# Patient Record
Sex: MALE | Race: WHITE | URBAN - METROPOLITAN AREA
[De-identification: names, ages, dates, MRNs, and addresses within clinical notes are randomized per-mention and may not be internally consistent; named-entity substitution may affect disease eponyms.]

---

## 2023-04-15 ENCOUNTER — EMERGENCY (EMERGENCY)
Facility: HOSPITAL | Age: 83
LOS: 0 days | Discharge: HOME | End: 2023-04-15
Attending: STUDENT IN AN ORGANIZED HEALTH CARE EDUCATION/TRAINING PROGRAM
Payer: MEDICARE

## 2023-04-15 VITALS
TEMPERATURE: 98 F | DIASTOLIC BLOOD PRESSURE: 66 MMHG | SYSTOLIC BLOOD PRESSURE: 164 MMHG | OXYGEN SATURATION: 98 % | HEART RATE: 70 BPM | RESPIRATION RATE: 18 BRPM

## 2023-04-15 VITALS
OXYGEN SATURATION: 97 % | TEMPERATURE: 98 F | DIASTOLIC BLOOD PRESSURE: 76 MMHG | WEIGHT: 162.04 LBS | SYSTOLIC BLOOD PRESSURE: 163 MMHG | RESPIRATION RATE: 18 BRPM | HEIGHT: 68 IN | HEART RATE: 68 BPM

## 2023-04-15 DIAGNOSIS — I10 ESSENTIAL (PRIMARY) HYPERTENSION: ICD-10-CM

## 2023-04-15 DIAGNOSIS — F03.90 UNSPECIFIED DEMENTIA WITHOUT BEHAVIORAL DISTURBANCE: ICD-10-CM

## 2023-04-15 DIAGNOSIS — R47.01 APHASIA: ICD-10-CM

## 2023-04-15 DIAGNOSIS — Z20.822 CONTACT WITH AND (SUSPECTED) EXPOSURE TO COVID-19: ICD-10-CM

## 2023-04-15 DIAGNOSIS — I72.5 ANEURYSM OF OTHER PRECEREBRAL ARTERIES: ICD-10-CM

## 2023-04-15 DIAGNOSIS — R42 DIZZINESS AND GIDDINESS: ICD-10-CM

## 2023-04-15 LAB
ALBUMIN SERPL ELPH-MCNC: 4 G/DL — SIGNIFICANT CHANGE UP (ref 3.5–5.2)
ALP SERPL-CCNC: 54 U/L — SIGNIFICANT CHANGE UP (ref 30–115)
ALT FLD-CCNC: 18 U/L — SIGNIFICANT CHANGE UP (ref 0–41)
ANION GAP SERPL CALC-SCNC: 7 MMOL/L — SIGNIFICANT CHANGE UP (ref 7–14)
APPEARANCE UR: CLEAR — SIGNIFICANT CHANGE UP
AST SERPL-CCNC: 19 U/L — SIGNIFICANT CHANGE UP (ref 0–41)
BASOPHILS # BLD AUTO: 0.02 K/UL — SIGNIFICANT CHANGE UP (ref 0–0.2)
BASOPHILS NFR BLD AUTO: 0.3 % — SIGNIFICANT CHANGE UP (ref 0–1)
BILIRUB SERPL-MCNC: 0.6 MG/DL — SIGNIFICANT CHANGE UP (ref 0.2–1.2)
BILIRUB UR-MCNC: NEGATIVE — SIGNIFICANT CHANGE UP
BUN SERPL-MCNC: 23 MG/DL — HIGH (ref 10–20)
CALCIUM SERPL-MCNC: 9.1 MG/DL — SIGNIFICANT CHANGE UP (ref 8.4–10.4)
CHLORIDE SERPL-SCNC: 105 MMOL/L — SIGNIFICANT CHANGE UP (ref 98–110)
CO2 SERPL-SCNC: 25 MMOL/L — SIGNIFICANT CHANGE UP (ref 17–32)
COLOR SPEC: SIGNIFICANT CHANGE UP
CREAT SERPL-MCNC: 0.7 MG/DL — SIGNIFICANT CHANGE UP (ref 0.7–1.5)
DIFF PNL FLD: NEGATIVE — SIGNIFICANT CHANGE UP
EGFR: 91 ML/MIN/1.73M2 — SIGNIFICANT CHANGE UP
EOSINOPHIL # BLD AUTO: 0.02 K/UL — SIGNIFICANT CHANGE UP (ref 0–0.7)
EOSINOPHIL NFR BLD AUTO: 0.3 % — SIGNIFICANT CHANGE UP (ref 0–8)
GLUCOSE SERPL-MCNC: 120 MG/DL — HIGH (ref 70–99)
GLUCOSE UR QL: NEGATIVE — SIGNIFICANT CHANGE UP
HCT VFR BLD CALC: 43 % — SIGNIFICANT CHANGE UP (ref 42–52)
HGB BLD-MCNC: 14.2 G/DL — SIGNIFICANT CHANGE UP (ref 14–18)
IMM GRANULOCYTES NFR BLD AUTO: 0.1 % — SIGNIFICANT CHANGE UP (ref 0.1–0.3)
KETONES UR-MCNC: NEGATIVE — SIGNIFICANT CHANGE UP
LEUKOCYTE ESTERASE UR-ACNC: NEGATIVE — SIGNIFICANT CHANGE UP
LYMPHOCYTES # BLD AUTO: 1.85 K/UL — SIGNIFICANT CHANGE UP (ref 1.2–3.4)
LYMPHOCYTES # BLD AUTO: 26.4 % — SIGNIFICANT CHANGE UP (ref 20.5–51.1)
MAGNESIUM SERPL-MCNC: 2.2 MG/DL — SIGNIFICANT CHANGE UP (ref 1.8–2.4)
MCHC RBC-ENTMCNC: 30.8 PG — SIGNIFICANT CHANGE UP (ref 27–31)
MCHC RBC-ENTMCNC: 33 G/DL — SIGNIFICANT CHANGE UP (ref 32–37)
MCV RBC AUTO: 93.3 FL — SIGNIFICANT CHANGE UP (ref 80–94)
MONOCYTES # BLD AUTO: 0.62 K/UL — HIGH (ref 0.1–0.6)
MONOCYTES NFR BLD AUTO: 8.9 % — SIGNIFICANT CHANGE UP (ref 1.7–9.3)
NEUTROPHILS # BLD AUTO: 4.48 K/UL — SIGNIFICANT CHANGE UP (ref 1.4–6.5)
NEUTROPHILS NFR BLD AUTO: 64 % — SIGNIFICANT CHANGE UP (ref 42.2–75.2)
NITRITE UR-MCNC: NEGATIVE — SIGNIFICANT CHANGE UP
NRBC # BLD: 0 /100 WBCS — SIGNIFICANT CHANGE UP (ref 0–0)
PH UR: 7 — SIGNIFICANT CHANGE UP (ref 5–8)
PLATELET # BLD AUTO: 203 K/UL — SIGNIFICANT CHANGE UP (ref 130–400)
PMV BLD: 9.2 FL — SIGNIFICANT CHANGE UP (ref 7.4–10.4)
POTASSIUM SERPL-MCNC: 4.6 MMOL/L — SIGNIFICANT CHANGE UP (ref 3.5–5)
POTASSIUM SERPL-SCNC: 4.6 MMOL/L — SIGNIFICANT CHANGE UP (ref 3.5–5)
PROT SERPL-MCNC: 6.7 G/DL — SIGNIFICANT CHANGE UP (ref 6–8)
PROT UR-MCNC: NEGATIVE — SIGNIFICANT CHANGE UP
RBC # BLD: 4.61 M/UL — LOW (ref 4.7–6.1)
RBC # FLD: 12.2 % — SIGNIFICANT CHANGE UP (ref 11.5–14.5)
SARS-COV-2 RNA SPEC QL NAA+PROBE: SIGNIFICANT CHANGE UP
SODIUM SERPL-SCNC: 137 MMOL/L — SIGNIFICANT CHANGE UP (ref 135–146)
SP GR SPEC: 1.01 — SIGNIFICANT CHANGE UP (ref 1.01–1.03)
UROBILINOGEN FLD QL: SIGNIFICANT CHANGE UP
WBC # BLD: 7 K/UL — SIGNIFICANT CHANGE UP (ref 4.8–10.8)
WBC # FLD AUTO: 7 K/UL — SIGNIFICANT CHANGE UP (ref 4.8–10.8)

## 2023-04-15 PROCEDURE — 83735 ASSAY OF MAGNESIUM: CPT

## 2023-04-15 PROCEDURE — U0005: CPT

## 2023-04-15 PROCEDURE — 99284 EMERGENCY DEPT VISIT MOD MDM: CPT | Mod: 25

## 2023-04-15 PROCEDURE — 82962 GLUCOSE BLOOD TEST: CPT

## 2023-04-15 PROCEDURE — 70498 CT ANGIOGRAPHY NECK: CPT | Mod: 26,MA

## 2023-04-15 PROCEDURE — 70496 CT ANGIOGRAPHY HEAD: CPT | Mod: MA,XU

## 2023-04-15 PROCEDURE — 93010 ELECTROCARDIOGRAM REPORT: CPT

## 2023-04-15 PROCEDURE — 96374 THER/PROPH/DIAG INJ IV PUSH: CPT | Mod: XU

## 2023-04-15 PROCEDURE — 85025 COMPLETE CBC W/AUTO DIFF WBC: CPT

## 2023-04-15 PROCEDURE — 36415 COLL VENOUS BLD VENIPUNCTURE: CPT

## 2023-04-15 PROCEDURE — 70498 CT ANGIOGRAPHY NECK: CPT | Mod: MA

## 2023-04-15 PROCEDURE — 87086 URINE CULTURE/COLONY COUNT: CPT

## 2023-04-15 PROCEDURE — U0003: CPT

## 2023-04-15 PROCEDURE — 70450 CT HEAD/BRAIN W/O DYE: CPT | Mod: MA,XU

## 2023-04-15 PROCEDURE — 70450 CT HEAD/BRAIN W/O DYE: CPT | Mod: 26,MA,59

## 2023-04-15 PROCEDURE — 81003 URINALYSIS AUTO W/O SCOPE: CPT

## 2023-04-15 PROCEDURE — 93005 ELECTROCARDIOGRAM TRACING: CPT

## 2023-04-15 PROCEDURE — 70496 CT ANGIOGRAPHY HEAD: CPT | Mod: 26,MA

## 2023-04-15 PROCEDURE — 80053 COMPREHEN METABOLIC PANEL: CPT

## 2023-04-15 PROCEDURE — 99284 EMERGENCY DEPT VISIT MOD MDM: CPT

## 2023-04-15 RX ORDER — MECLIZINE HCL 12.5 MG
25 TABLET ORAL ONCE
Refills: 0 | Status: COMPLETED | OUTPATIENT
Start: 2023-04-15 | End: 2023-04-15

## 2023-04-15 RX ORDER — METOCLOPRAMIDE HCL 10 MG
10 TABLET ORAL ONCE
Refills: 0 | Status: COMPLETED | OUTPATIENT
Start: 2023-04-15 | End: 2023-04-15

## 2023-04-15 RX ORDER — SODIUM CHLORIDE 9 MG/ML
1000 INJECTION INTRAMUSCULAR; INTRAVENOUS; SUBCUTANEOUS ONCE
Refills: 0 | Status: COMPLETED | OUTPATIENT
Start: 2023-04-15 | End: 2023-04-15

## 2023-04-15 RX ORDER — MECLIZINE HCL 12.5 MG
1 TABLET ORAL
Qty: 28 | Refills: 0
Start: 2023-04-15 | End: 2023-04-21

## 2023-04-15 RX ADMIN — Medication 104 MILLIGRAM(S): at 15:17

## 2023-04-15 RX ADMIN — Medication 25 MILLIGRAM(S): at 19:25

## 2023-04-15 RX ADMIN — Medication 25 MILLIGRAM(S): at 15:17

## 2023-04-15 RX ADMIN — SODIUM CHLORIDE 1000 MILLILITER(S): 9 INJECTION INTRAMUSCULAR; INTRAVENOUS; SUBCUTANEOUS at 15:17

## 2023-04-15 NOTE — ED PROVIDER NOTE - PROGRESS NOTE DETAILS
Authored by Dr. Barahona: pt feeling much better, symptoms resolved. consulted neurosurg who note pt can follow up as outpt as this is low risk aneurysm for rupture and does not require admission or urgent intervention. All results explained to pt and family CO- pt observed in ED 5+ hours. pt with complete resolution of symptoms. no FND on serial exam. no cerebellar signs on multiple exams. pt able to ambulate at baseline and was ambulated in front of family who confirm this. family states pt appears well, is at baseline mental status and even appears "energetic." discussed all results with pt/family in addition to resident and consultant discussion.  discussed neurosurg does not believe the aneurysm requires acute intervention but need for close follow up stressed.  discussed that presenting symptoms not  consistent w/ aneurysm. pt and family confirm NO headache.  offered further eval with neuro consult and possible EDOU for MRI Brain to completely rule out stroke. low suspicion for stroke given entire clinical picture. given risks/ benefits of hospitalization given dementia/age/ potential deconditioning/sundowning/delirium in contrast to patient's well appearance and resolution of sx, pt and family prefer to be discharged to f/u closely with their private neurologist and neurosurgery. pt and family had opportunities to ask questions, discuss concerns with plan and bring up alternative considerations/options. clinical picture points more to peripheral cause of vertigo with incidental aneurysm. pt stable for dc, close f/u and return precautions

## 2023-04-15 NOTE — ED PROVIDER NOTE - CARE PROVIDER_API CALL
Jordan Scott; [facility])  Surgery  Neurosurgery  [address] [place] [address]  Phone: [phone]  Fax: [phone]  Follow Up Time: 4-6 Days

## 2023-04-15 NOTE — ED ADULT TRIAGE NOTE - CHIEF COMPLAINT QUOTE
as per wife he woke up dizzy like the room is spinning with nausea and a terrible taste in mouth, didn't eat anything" fs 149

## 2023-04-15 NOTE — CONSULT NOTE ADULT - ASSESSMENT
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 Scott in 1 week   - Discussed case with attending

## 2023-04-15 NOTE — ED PROVIDER NOTE - ATTENDING CONTRIBUTION TO CARE
81 yo m hx htn, dementia w/ aphasia  pt w/ room spinning dizziness w/ nausea.  pt has had similar events in the past. pt states today, it was worse than normal. sx worse w/ standing/head movements. no change in hearing/tinnitus.  no fever/cp/ap/urinary complaints. no focal weakness.  no numbness/tingling.      vss  gen- NAD, aaox3  card-rrr  lungs-ctab, no wheezing or rhonchi  abd-sntnd, no guarding or rebound  neuro- full str/sensation, cn ii-xii grossly intact, normal coordination, no truncal ataxia, nml ftn b/l 83 yo m hx htn, dementia w/ aphasia  pt w/ room spinning dizziness w/ nausea.  pt has had similar events in the past. pt states today, it was worse than normal. sx worse w/ standing/head movements. no change in hearing/tinnitus.  no fever/cp/ap/urinary complaints. no focal weakness.  no numbness/tingling.      vss  gen- NAD, aaox3  card-rrr  lungs-ctab, no wheezing or rhonchi  abd-sntnd, no guarding or rebound  neuro- full str/sensation, cn ii-xii grossly intact, normal coordination, no truncal ataxia, nml ftn b/l, neck supple, no meningismus, normal speech, normal word finding   Spine- no midline c/t/l spine ttp, neck supple, FROM to neck

## 2023-04-15 NOTE — ED PROVIDER NOTE - NSFOLLOWUPINSTRUCTIONS_ED_ALL_ED_FT
Dizziness    Dizziness can manifest as a feeling of unsteadiness or light-headedness. You may feel like you are about to faint. This condition can be caused by a number of things, including medicines, dehydration, or illness. Drink enough fluid to keep your urine clear or pale yellow. Do not drink alcohol and limit your caffeine intake. Avoid quick or sudden movements.  Rise slowly from chairs and steady yourself until you feel okay. In the morning, first sit up on the side of the bed.    SEEK IMMEDIATE MEDICAL CARE IF YOU HAVE ANY OF THE FOLLOWING SYMPTOMS: vomiting, changes in your vision or speech, weakness in your arms or legs, trouble speaking or swallowing, chest pain, abdominal pain, shortness of breath, sweating, bleeding, headache, neck pain, or fever.      Nausea / Vomiting    Nausea is the feeling that you have to vomit. As nausea gets worse, it can lead to vomiting. Vomiting puts you at an increased risk for dehydration. Older adults and people with other diseases or a weak immune system are at higher risk for dehydration. Drink clear fluids in small but frequent amounts as tolerated. Eat bland, easy-to-digest foods in small amounts as tolerated.    SEEK IMMEDIATE MEDICAL CARE IF YOU HAVE ANY OF THE FOLLOWING SYMPTOMS: fever, inability to keep sufficient fluids down, black or bloody vomitus, black or bloody stools, lightheadedness/dizziness, chest pain, severe headache, rash, shortness of breath, cold or clammy skin, confusion, pain with urination, or any signs of dehydration.

## 2023-04-15 NOTE — CONSULT NOTE ADULT - SUBJECTIVE AND OBJECTIVE BOX
NEUROSURGERY CONSULT  TORRES DIAZ   04-15-23 @ 18:35    HPI: 82y Male HTN, dementia w/ aphasia presenting with worsening dizziness     Neurosurgery was consulted for a patient presenting with worsening dizziness with CTA showing R cavernous 7mm ICA aneurysm. Patient seen and examined at bedside with daughter and wife. States that he feels that the dizziness has improved with meclizine. Patient denies headaches, N/V, blurry vision, changes in vision.     RADIOLOGY:   < from: CT Angio Head w/ IV Cont (04.15.23 @ 16:28) >  IMPRESSION:  No large vessel occlusion  or vascular malformation.  Right cavernous ICA saccular aneurysmal dilatation to 7 mm.  Mild bilateral cavernous ICA stenosis.  Mild bilateral vertebral artery V4 segment stenosis.    < from: CT Head No Cont (04.15.23 @ 15:54) >  IMPRESSION:  No acute intracranial pathology.  Mild-moderate chronic microvascular ischemic changes.    PAST MEDICAL & SURGICAL HISTORY:    FAMILY HISTORY:      SOCIAL HISTORY:  Tobacco Use:  EtOH use:   Substance:    Allergies    No Known Allergies    Intolerances        [X] A ten-point review of systems is negative except as noted   [  ] Due to altered mental status/intubation, subjective information were not able to be obtained from the patient. History was obtained, to the extent possible, from review of the chart and collateral sources of information    MEDS:      PHYSICAL EXAM:  GENERAL: NAD, speaks in full sentences, no signs of respiratory distress  HEAD:  Atraumatic, Normocephalic  NECK: Supple, No JVD  CHEST/LUNG: Clear to auscultation bilaterally; No wheeze; No crackles; No accessory muscles used  HEART: Regular rate and rhythm;   ABDOMEN: Soft, Nontender, Nondistended; Bowel sounds present; No guarding  EXTREMITIES:  2+ Peripheral Pulses, No cyanosis or edema  MSK: No tenderness to palpation    NEUROLOGICAL EXAM   Awake, alert, following commands, Ox2   Face symmetrical   EOMI PERRL   MAEx4 with good strength   SILT   No dysmetria   No drift     Vital Signs Last 24 Hrs  T(C): 36.9 (15 Apr 2023 15:27), Max: 37.1 (15 Apr 2023 13:44)  T(F): 98.4 (15 Apr 2023 15:27), Max: 98.7 (15 Apr 2023 13:44)  HR: 70 (15 Apr 2023 15:27) (68 - 70)  BP: 164/66 (15 Apr 2023 15:27) (163/76 - 164/66)  BP(mean): --  RR: 18 (15 Apr 2023 15:27) (18 - 18)  SpO2: 98% (15 Apr 2023 15:27) (97% - 98%)    Parameters below as of 15 Apr 2023 15:27  Patient On (Oxygen Delivery Method): room air          LABS:                        14.2   7.00  )-----------( 203      ( 15 Apr 2023 15:40 )             43.0     04-15    137  |  105  |  23<H>  ----------------------------<  120<H>  4.6   |  25  |  0.7    Ca    9.1      15 Apr 2023 15:40  Mg     2.2     04-15    TPro  6.7  /  Alb  4.0  /  TBili  0.6  /  DBili  x   /  AST  19  /  ALT  18  /  AlkPhos  54  04-15

## 2023-04-15 NOTE — ED PROVIDER NOTE - PATIENT PORTAL LINK FT
You can access the FollowMyHealth Patient Portal offered by [facility] by registering at the following website: [email or web address]. By joining [employer]’s FollowMyHealth portal, you will also be able to view your health information using other applications (apps) compatible with our system.

## 2023-04-15 NOTE — ED ADULT NURSE REASSESSMENT NOTE - NS ED NURSE REASSESS COMMENT FT1
Pt reports he was not dizzy sitting down but once he stood up he got dizzy and then it went away. MD notified.

## 2023-04-15 NOTE — ED PROVIDER NOTE - PHYSICAL EXAMINATION
VITAL SIGNS: I have reviewed nursing notes and confirm.  CONSTITUTIONAL: non-toxic, well appearing  SKIN: no rash, no petechiae.  EYES: PERRL, pink conjunctiva, anicteric  ENT: tongue midline, no exudates, MMM  NECK: Supple; no meningismus  CARD: RRR, no murmurs, equal radial pulses bilaterally 2+  RESP: CTAB, no respiratory distress  ABD: Soft, non-tender  NEURO: Alert, oriented. CN2-12 intact, equal strength bilaterally, sensation intact, no slurred speech, no facial droop  PSYCH: Cooperative, appropriate.

## 2023-04-15 NOTE — ED PROVIDER NOTE - OBJECTIVE STATEMENT
82y Male w PMHx of HTN and dementia w/ aphasia presenting with worsening dizziness and nausea since this AM. Pt describes sx as room spinning. Denies syncope, headache, f/c, CP, SOB, N/V, blurry vision. 82y Male w PMHx of HTN and dementia w/ aphasia presenting with worsening dizziness and nausea since this AM. Pt describes sx as room spinning. Patient reports similar events in the past but notes that today symptoms were worse.  Symptoms are associated with standing and certain head movements. Denies syncope, numbness weakness tingling headache, f/c, CP, SOB, N/V, blurry vision, tinnitus or hearing loss.

## 2023-04-16 LAB
CULTURE RESULTS: SIGNIFICANT CHANGE UP
SPECIMEN SOURCE: SIGNIFICANT CHANGE UP

## 2023-04-17 ENCOUNTER — NON-APPOINTMENT (OUTPATIENT)
Age: 83
End: 2023-04-17

## 2023-04-17 PROBLEM — R42 DIZZINESS AND GIDDINESS: Chronic | Status: ACTIVE | Noted: 2023-04-16

## 2023-04-17 PROBLEM — Z00.00 ENCOUNTER FOR PREVENTIVE HEALTH EXAMINATION: Status: ACTIVE | Noted: 2023-04-17

## 2023-04-17 PROBLEM — I10 ESSENTIAL (PRIMARY) HYPERTENSION: Chronic | Status: ACTIVE | Noted: 2023-04-16

## 2023-04-17 PROBLEM — F03.90 UNSPECIFIED DEMENTIA, UNSPECIFIED SEVERITY, WITHOUT BEHAVIORAL DISTURBANCE, PSYCHOTIC DISTURBANCE, MOOD DISTURBANCE, AND ANXIETY: Chronic | Status: ACTIVE | Noted: 2023-04-16

## 2023-04-19 ENCOUNTER — APPOINTMENT (OUTPATIENT)
Dept: NEUROSURGERY | Facility: CLINIC | Age: 83
End: 2023-04-19
Payer: MEDICARE

## 2023-04-19 VITALS — WEIGHT: 166 LBS | BODY MASS INDEX: 25.16 KG/M2 | HEIGHT: 68 IN

## 2023-04-19 DIAGNOSIS — Z09 ENCOUNTER FOR FOLLOW-UP EXAMINATION AFTER COMPLETED TREATMENT FOR CONDITIONS OTHER THAN MALIGNANT NEOPLASM: ICD-10-CM

## 2023-04-19 DIAGNOSIS — Z78.9 OTHER SPECIFIED HEALTH STATUS: ICD-10-CM

## 2023-04-19 DIAGNOSIS — R42 DIZZINESS AND GIDDINESS: ICD-10-CM

## 2023-04-19 PROCEDURE — 99202 OFFICE O/P NEW SF 15 MIN: CPT

## 2023-04-20 PROBLEM — R42 VERTIGO: Status: ACTIVE | Noted: 2023-04-20

## 2023-04-20 PROBLEM — Z09 HOSPITAL DISCHARGE FOLLOW-UP: Status: ACTIVE | Noted: 2023-04-20

## 2023-04-20 PROBLEM — Z78.9 NON-SMOKER: Status: ACTIVE | Noted: 2023-04-19

## 2023-04-20 RX ORDER — MEMANTINE HYDROCHLORIDE 5 MG/1
5 TABLET, FILM COATED ORAL
Refills: 0 | Status: ACTIVE | COMMUNITY

## 2023-04-20 RX ORDER — MECLIZINE HYDROCHLORIDE 25 MG/1
25 TABLET ORAL
Refills: 0 | Status: ACTIVE | COMMUNITY

## 2023-06-02 ENCOUNTER — APPOINTMENT (OUTPATIENT)
Dept: NEUROSURGERY | Facility: CLINIC | Age: 83
End: 2023-06-02

## 2023-07-16 NOTE — REVIEW OF SYSTEMS
[As Noted in HPI] : as noted in HPI [Vertigo] : vertigo [Difficulty Walking] : no difficulty walking [Frequent Falls] : not falling

## 2023-07-16 NOTE — HISTORY OF PRESENT ILLNESS
[de-identified] : 82-year-old male presents to the neurosurgery office today alongside his daughter and wife as a hospital follow-up to review results of a CTA head and neck with and without IV contrast. \par \par No past medical or surgical history\par \par Mr. Rojo initially presented to the emergency department with vertigo-like symptoms.  CTA of the head and neck with and without IV contrast was performed on 4/15/2023 that revealed no evidence of aneurysm, vascular malformation, or large vessel occlusion.  He was discharged with Meclizine and states that it does help to alleviate the symptoms when taken, as needed.  The patient along with his family were educated that there is no need for endovascular or neurosurgical intervention and he may follow-up with either primary medical doctor or ENT specialist for a vertigo work-up.  They were grateful for this information and denied any further questions at this time. \par \par During the visit today the patient denied weakness to upper or lower extremities, bowel or bladder dysfunction, headaches, visual deficits, gait disturbances, or paresthesias. \par He may follow-up on an as-needed basis. \par \par Physical Exam:\par Constitutional: Well appearing, no distress\par HEENT: Normocephalic Atraumatic\par Psychiatric: Awake, alert, oriented to person, place, situation and time; seems forgetful at times. Normal affect. Follows commands. \par Language: Speech is clear, fluent with good repetition & comprehension. No dysarthria. \par Pulmonary: No respiratory distress\par \par Neurologic:\par CN II-XII grossly intact; EOMI with no nystagmus. No facial asymmetry bilaterally, full eye closure strength bilaterally. [place]. Head turning & shoulder shrug intact, bilaterally. Tongue midline, normal movements, no atrophy. Smile symmetrical. \par Strength: Full strength in all major muscle groups\par Sensation: Full sensation to light touch in all extremities, V1-V3 sensation bilaterally intact. \par Motor: No pronator drift, muscle strength of bilateral UE and bilateral LE: 5/5. Normal muscle tone. \par Reflexes: DTR of biceps, knee and ankle normal \par No Clonus\par No Villarreal's\par \par ROM\par \par Gait: Ambulating steadily without any assistive devices. \par

## 2025-01-11 ENCOUNTER — NON-APPOINTMENT (OUTPATIENT)
Age: 85
End: 2025-01-11